# Patient Record
Sex: MALE | Race: BLACK OR AFRICAN AMERICAN | Employment: FULL TIME
[De-identification: names, ages, dates, MRNs, and addresses within clinical notes are randomized per-mention and may not be internally consistent; named-entity substitution may affect disease eponyms.]

---

## 2017-01-11 ENCOUNTER — TELEPHONE (OUTPATIENT)
Dept: CASE MANAGEMENT | Age: 47
End: 2017-01-11

## 2021-02-08 ENCOUNTER — NURSE TRIAGE (OUTPATIENT)
Dept: OTHER | Facility: CLINIC | Age: 51
End: 2021-02-08

## 2021-02-08 NOTE — TELEPHONE ENCOUNTER
Left message advising pt that per provider As far as I know the Covid vaccine will not cause a positive test-since difficult to know what is occurring recommend patient [facility].

## 2021-02-08 NOTE — TELEPHONE ENCOUNTER
Reason for Disposition   [1] Typical COVID-19 symptoms AND [2] symptoms that are NOT expected from vaccine (e.g., cough, difficulty breathing, loss of taste or smell, runny nose, sore throat)   COVID-19 Testing, questions about    Answer Assessment - Initial Assessment Questions  1. MAIN CONCERN OR SYMPTOM:  \"What is your main concern right now? \" \"What question do you have? \" \"What's the main symptom you're worried about? \" (e.g., fever, pain, redness, swelling)      Pt developed cough, sneezing on Saturday after receiving the Covid vaccine on Friday evening. Pt wants to know if he should get tested for Covid-19 and if the vaccine will cause you to test positive on a viral test.    2. VACCINE: \"What vaccination did you receive? \" \"Is this your first or second shot? \" (e.g., none; Trey Hosteller, other)      Reza Archuleta    3. SYMPTOM ONSET: \"When did the symptoms begin? \" (e.g., not relevant; hours, days)       Saturday    4. SYMPTOM SEVERITY: \"How bad is it? \"       Moderate    5. FEVER: \"Is there a fever? \" If so, ask: \"What is it, how was it measured, and when did it start? \"       Denies    6. PAST REACTIONS: \"Have you reacted to immunizations before? \" If so, ask: \"What happened? \"      Denies    7. OTHER SYMPTOMS: \"Do you have any other symptoms? \"      Fatigue    Protocols used: CORONAVIRUS (COVID-19) VACCINE QUESTIONS AND REACTIONS-ADULT-[place], CORONAVIRUS ([id number]) DIAGNOSED OR SUSPECTED-ADULT-    Call received on [address]. Brief description of triage: Answered patient's questions using CDC. gov as a reference:  After getting a COVID-19 vaccine, will I test positive for COVID-19 on a viral test?  No. Neither the recently authorized and recommended vaccines nor the other COVID-19 vaccines currently in clinical trials in the [place] can cause you to test positive on viral tests, which are used to see if you have a current infection. ?    If your body develops an immune response--the goal of vaccination--there is a possibility you may test positive on some antibody tests. Antibody tests indicate you had a previous infection and that you may have some level of protection against the virus. Experts are currently looking at how COVID-19 vaccination may affect antibody testing results. Triage indicates for patient to perform home care. Care advice provided. Recommended using local department of health website for testing locations and up to date information. Caller verbalizes understanding, denies any other questions or concerns; instructed to call back for any new or worsening symptoms.

## 2021-02-08 NOTE — TELEPHONE ENCOUNTER
Apparently does not want to be seen [facility]-if he wants we can set him up for a Covid test to see if it is positive instead. However he should isolate as if he were positive.

## 2021-02-08 NOTE — TELEPHONE ENCOUNTER
Patient returned call from office declined making appointment with [facility] at this time said he would call back

## 2021-02-08 NOTE — TELEPHONE ENCOUNTER
As far as I know the Covid vaccine will not cause a positive test-since difficult to know what is occurring recommend [facility] be [facility].